# Patient Record
Sex: FEMALE | Race: WHITE | URBAN - METROPOLITAN AREA
[De-identification: names, ages, dates, MRNs, and addresses within clinical notes are randomized per-mention and may not be internally consistent; named-entity substitution may affect disease eponyms.]

---

## 2017-06-06 ENCOUNTER — OFFICE VISIT (OUTPATIENT)
Dept: FAMILY MEDICINE CLINIC | Age: 27
End: 2017-06-06

## 2017-06-06 VITALS
WEIGHT: 106 LBS | SYSTOLIC BLOOD PRESSURE: 115 MMHG | DIASTOLIC BLOOD PRESSURE: 75 MMHG | HEART RATE: 100 BPM | TEMPERATURE: 98.6 F | BODY MASS INDEX: 20.7 KG/M2

## 2017-06-06 DIAGNOSIS — S99.922A INJURY OF TOE ON LEFT FOOT, INITIAL ENCOUNTER: Primary | ICD-10-CM

## 2017-06-06 PROCEDURE — 99213 OFFICE O/P EST LOW 20 MIN: CPT | Performed by: NURSE PRACTITIONER

## 2018-02-13 ENCOUNTER — TELEPHONE (OUTPATIENT)
Dept: FAMILY MEDICINE CLINIC | Age: 28
End: 2018-02-13

## 2018-02-13 DIAGNOSIS — Z20.828 EXPOSURE TO THE FLU: Primary | ICD-10-CM

## 2018-02-13 NOTE — TELEPHONE ENCOUNTER
Patient's mom called and stated that her grandson was diagnosed with the flu today. Patient's mom wanted to know if Dr. Lilian Diez would send in a prescription for [facility] for the Tamaflu. Patient's mom states that the entire family is leaving to go on a cruise on Sunday and they do not want to get the flu. Patient's mom is aware that Dr. Lilian Diez is not in the office today. Patient's mom called because [facility] is working until 8 pm.    Please advise.

## 2018-02-14 RX ORDER — OSELTAMIVIR PHOSPHATE 75 MG/1
75 CAPSULE ORAL DAILY
Qty: 10 CAPSULE | Refills: 0 | Status: SHIPPED | OUTPATIENT
Start: 2018-02-14 | End: 2018-02-24

## 2021-11-16 ASSESSMENT — ENCOUNTER SYMPTOMS
EYE PAIN: 0
ABDOMINAL PAIN: 0
DIARRHEA: 0
SHORTNESS OF BREATH: 0
COUGH: 0
CONSTIPATION: 0

## 2021-11-16 NOTE — PROGRESS NOTES
2021    TELEHEALTH EVALUATION -- Audio/Visual (During [facility]- public health emergency)    HPI:    Srinivas Bhatia (:  1990) has requested an audio/video evaluation for the following concern(s):    Moved back from [place]. Tested pos for COVID-->2021  Symptoms started-->2021  Missed work on 2021  10 day quarantine (2021)  RTW     FMLA paperwork need to be filled out for work. Starting to feel better. Had a baby this year. Review of Systems   Constitutional: Negative for appetite change, chills, fatigue and fever. HENT: Negative for congestion. Eyes: Negative for pain and visual disturbance. Respiratory: Negative for cough and shortness of breath. Cardiovascular: Negative for chest pain and palpitations. Gastrointestinal: Negative for abdominal pain, constipation and diarrhea. Genitourinary: Negative for difficulty urinating. Musculoskeletal: Negative for arthralgias. Skin: Negative for rash and wound. Neurological: Negative for dizziness, weakness, light-headedness and headaches. Hematological: Does not bruise/bleed easily. Psychiatric/Behavioral: Negative for behavioral problems.        Prior to Visit Medications    Not on File       Social History     Tobacco Use    Smoking status: Never Smoker    Smokeless tobacco: Never Used   Vaping Use    Vaping Use: Never used   Substance Use Topics    Alcohol use: Not Currently    Drug use: No        No Known Allergies,   Past Medical History:   Diagnosis Date    Allergic rhinitis     History of chicken pox    ,   Past Surgical History:   Procedure Laterality Date    WISDOM TOOTH EXTRACTION     ,   Social History     Tobacco Use    Smoking status: Never Smoker    Smokeless tobacco: Never Used   Vaping Use    Vaping Use: Never used   Substance Use Topics    Alcohol use: Not Currently    Drug use: No   ,   Family History   Problem Relation Age of Onset    High Blood Pressure Mother    Bryanna Sales Diabetes Father     High Blood Pressure Father     High Cholesterol Father     No Known Problems Brother     No Known Problems Brother     Cancer Maternal Grandmother         lung    Cancer Other         lung cancer   ,   Immunization History   Administered Date(s) Administered    DTaP 1989, 01/04/1990, 03/12/1990, 02/27/1991, 07/26/1994    HPV Quadrivalent (Gardasil) 01/15/2008, 03/20/2008, 07/23/2008    Hepatitis B 11/05/1996, 12/13/1996, 05/28/1997    Hib, unspecified 11/14/1990    Influenza 09/28/2010, 09/13/2011    Influenza Whole 11/05/1996    MMR 11/14/1990, 06/07/1999    Meningococcal MCV4P (Menactra) 04/26/2007    PPD Test 05/21/2008, 06/04/2008, 09/28/2010, 10/06/2010, 08/23/2011, 07/20/2015, 07/28/2015    Polio IPV (IPOL) 1989, 01/04/1990, 02/27/1991, 07/26/1994    Tdap (Boostrix, Adacel) 09/28/2010    Tetanus 07/31/2003   ,   Health Maintenance   Topic Date Due    Hepatitis C screen  Never done    Varicella vaccine (1 of 2 - 2-dose childhood series) Never done    COVID-19 Vaccine (1) Never done    HIV screen  Never done    Cervical cancer screen  Never done    DTaP/Tdap/Td vaccine (7 - Td or Tdap) 09/28/2020    Flu vaccine (1) 09/01/2021    Hepatitis B vaccine  Completed    Hib vaccine  Completed    Meningococcal (ACWY) vaccine  Completed    Hepatitis A vaccine  Aged Out    Pneumococcal 0-64 years Vaccine  Aged Out       PHYSICAL EXAMINATION:  [ INSTRUCTIONS:  \"[x]\" Indicates a positive item  \"[]\" Indicates a negative item  -- DELETE ALL ITEMS NOT EXAMINED]  [x] Alert  [] Oriented to person/place/time    [x] No apparent distress  [] Toxic appearing    [] Face flushed appearing [x] Sclera clear  [] Lips are cyanotic      [x] Breathing appears normal  [] Appears tachypneic      [] Rash on visible skin    [x] Cranial Nerves II-XII grossly intact    [] Motor grossly intact in visible upper extremities    [] Motor grossly intact in visible lower extremities    [x] Normal Mood  [] Anxious appearing    [] Depressed appearing  [] Confused appearing      Due to this being a TeleHealth encounter, evaluation of the following organ systems is limited: Vitals/Constitutional/EENT/Resp/CV/GI//MS/Neuro/Skin/Heme-Lymph-Imm. ASSESSMENT/PLAN:  1. Encounter to establish care  All questions answered. F/u discussed. Appropriate healthy lifestyle modifications discussed. 2. COVID  Diagnosed on 11/12/2021  Symptoms began 11/11/2021  Will need FMLA paperwork filled out. No follow-ups on file. No future appointments. An  electronic signature was used to authenticate this note. --Stephanie Trammell MD on 11/17/2021 at 7:44 AM    {Coding Help -- Use CPT 16719-57031 with EM elements or Time rules for Office Visits. :    >20 minutes were spent on the digital evaluation and management of this patient. Pursuant to the emergency declaration under the [facility], [facility] waiver authority and the [employer] Act, this Virtual  Visit was conducted, with patient's consent, to reduce the patient's risk of exposure to COVID-19 and provide continuity of care for an established patient. Services were provided through a video synchronous discussion virtually to substitute for in-person clinic visit.

## 2021-11-17 ENCOUNTER — VIRTUAL VISIT (OUTPATIENT)
Dept: PRIMARY CARE CLINIC | Age: 31
End: 2021-11-17
Payer: COMMERCIAL

## 2021-11-17 DIAGNOSIS — Z76.89 ENCOUNTER TO ESTABLISH CARE: Primary | ICD-10-CM

## 2021-11-17 DIAGNOSIS — U07.1 COVID: ICD-10-CM

## 2021-11-17 PROCEDURE — 99203 OFFICE O/P NEW LOW 30 MIN: CPT | Performed by: FAMILY MEDICINE

## 2021-11-17 ASSESSMENT — PATIENT HEALTH QUESTIONNAIRE - PHQ9
SUM OF ALL RESPONSES TO PHQ QUESTIONS 1-9: 0
SUM OF ALL RESPONSES TO PHQ QUESTIONS 1-9: 0
1. LITTLE INTEREST OR PLEASURE IN DOING THINGS: 0
SUM OF ALL RESPONSES TO PHQ9 QUESTIONS 1 & 2: 0
SUM OF ALL RESPONSES TO PHQ QUESTIONS 1-9: 0
2. FEELING DOWN, DEPRESSED OR HOPELESS: 0